# Patient Record
Sex: FEMALE | Race: OTHER | URBAN - METROPOLITAN AREA
[De-identification: names, ages, dates, MRNs, and addresses within clinical notes are randomized per-mention and may not be internally consistent; named-entity substitution may affect disease eponyms.]

---

## 2022-05-16 ENCOUNTER — CONSULTATION/EVALUATION (OUTPATIENT)
Dept: URBAN - METROPOLITAN AREA CLINIC 20 | Facility: CLINIC | Age: 42
End: 2022-05-16

## 2022-05-16 DIAGNOSIS — H52.223: ICD-10-CM

## 2022-05-16 DIAGNOSIS — H16.223: ICD-10-CM

## 2022-05-16 DIAGNOSIS — H52.13: ICD-10-CM

## 2022-05-16 PROCEDURE — 76514 ECHO EXAM OF EYE THICKNESS: CPT

## 2022-05-16 PROCEDURE — 99499LK REFRACTIVE CONSULT/LASIK

## 2022-05-16 PROCEDURE — 92025 CPTRIZED CORNEAL TOPOGRAPHY: CPT

## 2022-05-16 ASSESSMENT — KERATOMETRY
OD_AXISANGLE2_DEGREES: 64
OS_K1POWER_DIOPTERS: 47.50
OS_AXISANGLE2_DEGREES: 131
OD_K2POWER_DIOPTERS: 48.50
OS_AXISANGLE_DEGREES: 041
OD_AXISANGLE_DEGREES: 156
OD_AXISANGLE2_DEGREES: 66
OD_AXISANGLE_DEGREES: 154
OS_AXISANGLE_DEGREES: 41
OS_K2POWER_DIOPTERS: 48.25
OD_K1POWER_DIOPTERS: 46.50

## 2022-05-16 ASSESSMENT — VISUAL ACUITY
OD_CC: J1+
OS_CC: J1+
OS_CC: 20/20
OU_CC: 20/20
OU_SC: J1+
OS_SC: 20/200
OD_SC: J1+
OD_SC: 20/200
OD_CC: 20/20
OS_SC: J1+
OU_SC: 20/200
OU_CC: J1+

## 2022-05-16 ASSESSMENT — PACHYMETRY
OS_CT_UM: 496
OD_CT_UM: 494

## 2022-06-29 ENCOUNTER — CONSULTATION/EVALUATION (OUTPATIENT)
Dept: URBAN - METROPOLITAN AREA SURGERY 8 | Facility: SURGERY | Age: 42
End: 2022-06-29

## 2022-06-29 DIAGNOSIS — H52.13: ICD-10-CM

## 2022-06-29 PROCEDURE — 99499LK REFRACTIVE CONSULT/LASIK

## 2022-06-29 ASSESSMENT — KERATOMETRY
OD_AXISANGLE2_DEGREES: 66
OS_K2POWER_DIOPTERS: 48.25
OD_K2POWER_DIOPTERS: 48.50
OD_K1POWER_DIOPTERS: 46.50
OD_AXISANGLE2_DEGREES: 64
OD_AXISANGLE_DEGREES: 156
OS_AXISANGLE2_DEGREES: 131
OS_K1POWER_DIOPTERS: 47.50
OD_AXISANGLE_DEGREES: 154
OS_AXISANGLE_DEGREES: 41
OS_AXISANGLE_DEGREES: 041

## 2022-07-13 ENCOUNTER — ESTABLISHED PATIENT (OUTPATIENT)
Dept: URBAN - METROPOLITAN AREA CLINIC 20 | Facility: CLINIC | Age: 42
End: 2022-07-13

## 2022-07-13 DIAGNOSIS — H52.223: ICD-10-CM

## 2022-07-13 PROCEDURE — LSK LASIK ENHANCEMENT

## 2022-07-13 ASSESSMENT — KERATOMETRY
OS_K2POWER_DIOPTERS: 48.25
OD_AXISANGLE2_DEGREES: 66
OS_AXISANGLE_DEGREES: 041
OD_K1POWER_DIOPTERS: 46.50
OS_AXISANGLE_DEGREES: 41
OS_K1POWER_DIOPTERS: 47.50
OD_AXISANGLE_DEGREES: 156
OD_AXISANGLE_DEGREES: 154
OD_K2POWER_DIOPTERS: 48.50
OS_AXISANGLE2_DEGREES: 131
OD_AXISANGLE2_DEGREES: 64

## 2022-07-13 NOTE — PROCEDURE NOTE: CLINICAL
PROCEDURE NOTE: LASIK OU. Diagnosis: Astigmatism, Regular. Prep: Antibiotic Drops. Betadine. After discussing risks, benefits and alternatives, patient has elected to proceed with the procedure and an informed consent was obtained. Prior to commencing surgery, patient identification, surgical procedure, site, and side with a time out were confirmed by the physician. Several drops of topical anesthetic and one drop of povidone iodine were instilled, and the eye was flushed with balance salt solution. A non-aspirating lid speculum was placed, and the patient was positioned under the femtosecond laser. A sterile patient interface was placed on the eye. After pressure was confirmed, and suction was achieved, the eye and flap procedure were centered and laser was applied, and LASIK flap created. The suction was discontinued, the patient interface was discarded, and the lid speculum removed. The patient was then positioned under the excimer laser. Prior to the treatment, patient identification, surgical procedure, site, and side with a time out were confirmed by the physician. A drop of topical anesthetic instilled. The patient was draped using a clean technique to isolate and cover the lashes and lid margins. An aspirating lid speculum was placed. A drop of balance salt solution was instilled. The edge of the LASIK flap was located and lifted with the Erin I IntraLase flap . The corneal bed dried with a Merocel eye sponge, the pupil was centered, and laser treatment was applied. An additional drop of balance salt solution applied to corneal bed; flap was repositioned/floated into position. Excess Balance Salt Solution was removed with a Manuel ring. The corneal flap was smoothed with wet and dry Merocel. A drop of topical antibiotic and steroid was instilled consecutively. The lid speculum removed. The patient was escorted to recovery area in stable condition and without complication. Post procedure instructions given. [place] Billy

## 2022-07-14 ENCOUNTER — POST-OP (OUTPATIENT)
Dept: URBAN - METROPOLITAN AREA CLINIC 20 | Facility: CLINIC | Age: 42
End: 2022-07-14

## 2022-07-14 DIAGNOSIS — Z98.890: ICD-10-CM

## 2022-07-14 PROCEDURE — 99024LK POST OP LASIK CARE ONLY

## 2022-07-14 ASSESSMENT — VISUAL ACUITY
OS_SC: 20/25+3
OD_SC: 20/15-3

## 2022-08-02 ENCOUNTER — POST-OP (OUTPATIENT)
Dept: URBAN - METROPOLITAN AREA CLINIC 21 | Facility: CLINIC | Age: 42
End: 2022-08-02

## 2022-08-02 DIAGNOSIS — Z98.890: ICD-10-CM

## 2022-08-02 PROCEDURE — 99024LK POST OP LASIK CARE ONLY

## 2022-08-02 ASSESSMENT — VISUAL ACUITY
OU_SC: 20/20
OD_SC: 20/20
OS_SC: 20/20

## 2022-08-02 ASSESSMENT — TONOMETRY
OD_IOP_MMHG: 6
OS_IOP_MMHG: 7

## 2022-10-04 ENCOUNTER — POST-OP (OUTPATIENT)
Dept: URBAN - METROPOLITAN AREA CLINIC 21 | Facility: CLINIC | Age: 42
End: 2022-10-04

## 2022-10-04 DIAGNOSIS — Z98.890: ICD-10-CM

## 2022-10-04 PROCEDURE — 99024LK POST OP LASIK CARE ONLY

## 2022-10-04 ASSESSMENT — VISUAL ACUITY
OD_SC: 20/20
OS_SC: 20/20

## 2022-10-04 ASSESSMENT — TONOMETRY
OS_IOP_MMHG: 10
OD_IOP_MMHG: 10

## 2022-11-15 NOTE — PATIENT DISCUSSION
1. \"Have you been to the ER, urgent care clinic since your last visit? Hospitalized since your last visit? \" No    2. \"Have you seen or consulted any other health care providers outside of the [address] [place] [address] since your last visit? \" No Artificial Tears 3-4 times a day OU.

## 2023-11-28 ENCOUNTER — ESTABLISHED PATIENT (OUTPATIENT)
Dept: URBAN - METROPOLITAN AREA CLINIC 21 | Facility: CLINIC | Age: 43
End: 2023-11-28

## 2023-11-28 DIAGNOSIS — H16.223: ICD-10-CM

## 2023-11-28 PROCEDURE — 99214 OFFICE O/P EST MOD 30 MIN: CPT

## 2023-11-28 ASSESSMENT — TONOMETRY
OS_IOP_MMHG: 6
OD_IOP_MMHG: 8

## 2023-11-28 ASSESSMENT — VISUAL ACUITY
OD_SC: 20/20-1
OS_SC: 20/20-2

## 2024-07-03 ENCOUNTER — DASHBOARD ENCOUNTERS (OUTPATIENT)
Age: 44
End: 2024-07-03

## 2024-07-03 ENCOUNTER — OFFICE VISIT (OUTPATIENT)
Dept: URBAN - METROPOLITAN AREA CLINIC 72 | Facility: CLINIC | Age: 44
End: 2024-07-03
Payer: COMMERCIAL

## 2024-07-03 VITALS
WEIGHT: 123.8 LBS | TEMPERATURE: 97.7 F | HEIGHT: 63 IN | SYSTOLIC BLOOD PRESSURE: 171 MMHG | HEART RATE: 82 BPM | DIASTOLIC BLOOD PRESSURE: 97 MMHG | BODY MASS INDEX: 21.93 KG/M2

## 2024-07-03 DIAGNOSIS — K64.0 GRADE I INTERNAL HEMORRHOIDS: ICD-10-CM

## 2024-07-03 PROCEDURE — 46600 DIAGNOSTIC ANOSCOPY SPX: CPT | Performed by: INTERNAL MEDICINE

## 2024-07-03 PROCEDURE — 99203 OFFICE O/P NEW LOW 30 MIN: CPT | Performed by: INTERNAL MEDICINE

## 2024-07-03 NOTE — EXAM-PHYSICAL EXAM
Rectal: No obvious external lesions, digital rectal exam with soft brown stool in rectal vault, anoscopy revealed very small hemorrhoid in the right posterior position no bleeding or masses. Medical assistant present as chaperone

## 2024-07-03 NOTE — HPI-TODAY'S VISIT:
Mrs. Blair is a 43-year-old female presents as a new patient for evaluation for hemorrhoids. since having children she has had issues with hemorrhoids feels a bulging with defecation, no blood no itching.  Status post hysterectomy.